# Patient Record
Sex: MALE | Race: WHITE | NOT HISPANIC OR LATINO | URBAN - METROPOLITAN AREA
[De-identification: names, ages, dates, MRNs, and addresses within clinical notes are randomized per-mention and may not be internally consistent; named-entity substitution may affect disease eponyms.]

---

## 2018-03-06 ENCOUNTER — EMERGENCY (EMERGENCY)
Facility: HOSPITAL | Age: 18
LOS: 0 days | Discharge: HOME | End: 2018-03-06
Attending: EMERGENCY MEDICINE | Admitting: INTERNAL MEDICINE

## 2018-03-06 VITALS
HEART RATE: 87 BPM | TEMPERATURE: 98 F | RESPIRATION RATE: 18 BRPM | SYSTOLIC BLOOD PRESSURE: 155 MMHG | DIASTOLIC BLOOD PRESSURE: 74 MMHG | OXYGEN SATURATION: 99 %

## 2018-03-06 DIAGNOSIS — R07.81 PLEURODYNIA: ICD-10-CM

## 2018-03-06 DIAGNOSIS — R06.02 SHORTNESS OF BREATH: ICD-10-CM

## 2018-03-06 DIAGNOSIS — Z98.890 OTHER SPECIFIED POSTPROCEDURAL STATES: ICD-10-CM

## 2018-03-06 LAB — D DIMER BLD IA.RAPID-MCNC: 123 NG/ML DDU — SIGNIFICANT CHANGE UP (ref 0–230)

## 2018-03-06 NOTE — ED PROVIDER NOTE - PHYSICAL EXAMINATION
Non-toxic in appearance. No respiratory distress. No pallor, no jaundice. Lungs CTA and equal b/l. S1 S2 regular, no murmur. Equal pulses in all extremities. ABD soft, no tenderness. No pedal edema, no calf tenderness. Right knee incision scar clean. No skin rash. No neurologic deficits.

## 2018-03-06 NOTE — ED PROVIDER NOTE - OBJECTIVE STATEMENT
18 y/o male S/P right ACL repair in 12/2017. c/o pleuritic chest pain and SOB on and off x 1 month. Non-exertional. No dizziness/diaphoresis/N/V/syncope. No recent travel. Non-smoker.  Sent by [facility] to R/O PE.  Had chest CT done pre-op which showed mildly dilated aortic root but no dissection.

## 2018-03-06 NOTE — ED PROVIDER NOTE - CARE PLAN
Assessment and plan of treatment:	Imp: Low suspicion for PE. Well's = 0.  A/P: Will check CXR and D-dimer and re-assess. Principal Discharge DX:	Chest pain  Assessment and plan of treatment:	Imp: Low suspicion for PE. Well's = 0.  A/P: Will check CXR and D-dimer and re-assess.

## 2019-04-26 ENCOUNTER — EMERGENCY (EMERGENCY)
Facility: HOSPITAL | Age: 19
LOS: 0 days | Discharge: HOME | End: 2019-04-26
Admitting: EMERGENCY MEDICINE
Payer: COMMERCIAL

## 2019-04-26 VITALS
HEART RATE: 89 BPM | RESPIRATION RATE: 18 BRPM | SYSTOLIC BLOOD PRESSURE: 156 MMHG | OXYGEN SATURATION: 99 % | TEMPERATURE: 98 F | DIASTOLIC BLOOD PRESSURE: 72 MMHG

## 2019-04-26 VITALS — WEIGHT: 259.93 LBS
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
## 2019-04-26 DIAGNOSIS — S89.90XA UNSPECIFIED INJURY OF UNSPECIFIED LOWER LEG, INITIAL ENCOUNTER: ICD-10-CM
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
## 2019-04-26 DIAGNOSIS — M25.561 PAIN IN RIGHT KNEE: ICD-10-CM

## 2019-04-26 DIAGNOSIS — Z79.1 LONG TERM (CURRENT) USE OF NON-STEROIDAL ANTI-INFLAMMATORIES (NSAID): ICD-10-CM

## 2019-04-26 PROCEDURE — 73562 X-RAY EXAM OF KNEE 3: CPT | Mod: 26,RT

## 2019-04-26 PROCEDURE — 73590 X-RAY EXAM OF LOWER LEG: CPT | Mod: 26,RT

## 2019-04-26 PROCEDURE — 99283 EMERGENCY DEPT VISIT LOW MDM: CPT

## 2019-04-26 NOTE — ED PROVIDER NOTE - PHYSICAL EXAMINATION
VITAL SIGNS: I have reviewed nursing notes and confirm.  CONSTITUTIONAL: Well-developed; well-nourished; in no acute distress.  SKIN: Skin exam is warm and dry, no acute rash.  HEAD: Normocephalic; atraumatic.  EYES: PERRL, EOM intact; conjunctiva and sclera clear.  ENT: No nasal discharge; airway clear.   NECK: Supple; non tender.  CARD: S1, S2 normal; no murmurs, gallops, or rubs. Regular rate and rhythm.  RESP: Clear to auscultation bilaterally. No wheezes, rales or rhonchi.  ABD: Normal bowel sounds; soft; non-distended; non-tender.   EXT: Decreased ROM of right knee however full passive ROM. +Swelling to right knee. Tenderness to medial aspect of right knee.   NEURO: Alert, oriented. Grossly unremarkable. No focal deficits.  PSYCH: Cooperative, appropriate.

## 2019-04-26 NOTE — ED PROVIDER NOTE - NSFOLLOWUPINSTRUCTIONS_ED_ALL_ED_FT
Knee Pain    WHAT YOU NEED TO KNOW:    What do I need to know about knee pain? Knee pain may start suddenly, or it may be a long-term problem. You may have pain on the side, front, or back of your knee. You may have knee stiffness and swelling. You may hear popping sounds or feel like your knee is giving way or locking up as you walk. You may feel pain when you sit, stand, walk, or climb up and down stairs.    What increases my risk for knee pain?     Obesity      A strain or tear in ligaments or tendons      A leg fracture or knee dislocation      Overuse of your knee      Osteoarthritis, rheumatoid arthritis, or gout      An infection, tumor, or cyst in your knee      Shoes that are not supportive, or training on a hard surface      Sports that involve jumping or pivoting on your knee    How is the cause of knee pain diagnosed? Your healthcare provider will examine your knee and ask about your symptoms. Tell your provider when the pain started and what you were doing at the time. Describe the pain, such as sharp, throbbing, or achy. Tell your provider about any knee injury or surgery you had. You may need any of the following:     MRI, CT, or ultrasound pictures may show an injury, fracture, or tumor.       Blood tests may be used to check the level of inflammation in your blood. The tests may also show signs of infection.      Arthroscopy is a procedure to look inside your knee joint with an arthroscope. An arthroscope is a flexible tube with a light and camera on the end. A knee arthroscopy is usually done to check for disease or damage inside your knee. These problems may be fixed during the procedure.    How is knee pain treated? Treatment will depend on the cause of your pain. You may need any of the following:     NSAIDs help decrease swelling and pain or fever. This medicine is available with or without a doctor's order. NSAIDs can cause stomach bleeding or kidney problems in certain people. If you take blood thinner medicine, always ask your healthcare provider if NSAIDs are safe for you. Always read the medicine label and follow directions.      Acetaminophen decreases pain and fever. It is available without a doctor's order. Ask how much to take and how often to take it. Follow directions. Read the labels of all other medicines you are using to see if they also contain acetaminophen, or ask your doctor or pharmacist. Acetaminophen can cause liver damage if not taken correctly. Do not use more than 4 grams (4,000 milligrams) total of acetaminophen in one day.       Prescription pain medicine may be given. Ask your healthcare provider how to take this medicine safely. Some prescription pain medicines contain acetaminophen. Do not take other medicines that contain acetaminophen without talking to your healthcare provider. Too much acetaminophen may cause liver damage. Prescription pain medicine may cause constipation. Ask your healthcare provider how to prevent or treat constipation.       Steroid injections may be given into your knee. Steroids reduce inflammation and pain.      Surgery may be used for some injuries, such as to repair a torn ACL.    What can I do to manage my symptoms?     Rest your knee so it can heal. Limit activities that increase your pain. Do low-impact exercises, such as walking or swimming.       Apply ice to help reduce swelling and pain. Use an ice pack, or put crushed ice in a plastic bag. Cover it with a towel before you apply it to your knee. Apply ice for 15 to 20 minutes every hour, or as directed.      Apply compression to help reduce swelling. Use a brace or bandage only as directed.      Elevate your knee to help decrease pain and swelling. Elevate your knee while you are sitting or lying down. Prop your leg on pillows to keep your knee above the level of your heart.      Prevent your knee from moving as directed. Your healthcare provider may put on a cast or splint. You may need to wear a leg brace to stabilize your knee. A leg brace can be adjusted to increase your range of motion as your knee heals. Hinged Knee Braces      What can I do to prevent knee pain?     Maintain a healthy weight. Extra weight increases your risk for knee pain. Ask your healthcare provider how much you should weigh. He or she can help you create a safe weight loss plan if you need to lose weight.    Exercise or train properly. Use the correct equipment for sports. Wear shoes that provide good support. Check your posture often as you exercise, play sports, or train for an event. This can help prevent stress and strain on your knees. Rest between sessions so you do not overwork your knees.    When should I seek immediate care?     Your pain is worse, even after treatment.     You cannot bend or straighten your leg completely.     The swelling around your knee does not go down even with treatment.    Your knee is painful and hot to the touch.     When should I contact my healthcare provider?     You have questions or concerns about your condition or care.

## 2019-04-26 NOTE — ED PROVIDER NOTE - NS ED ROS FT
Review of Systems:  	•	CONSTITUTIONAL - no fever, no diaphoresis, no chills  	•	SKIN - no rash  	•	HEMATOLOGIC - no bleeding, no bruising  	•	EYES - no eye pain, no blurry vision  	•	ENT - no congestion  	•	MUSCULOSKELETAL - +knee pain,  no swelling, no redness  	•	NEUROLOGIC - no weakness, no headache, no paresthesias  	•	PSYCH - no anxiety, non suicidal, non homicidal, no hallucination, no depression  	All other ROS are negative except as documented in HPI.

## 2019-04-26 NOTE — ED PROVIDER NOTE - CARE PROVIDER_API CALL
Eduardo Schaefer (MD)  Surgery  [address] [place] [address]  Phone: [phone]  Fax: [phone]  Follow Up Time: 1-3 Days

## 2019-04-26 NOTE — ED PROVIDER NOTE - CLINICAL SUMMARY MEDICAL DECISION MAKING FREE TEXT BOX
Knee pain after hearing pop yesterday while playing basketball with limited and painful ROM of right knee. Xray neg for fx or dislocation. Small knee effusion noted on xray. Likely knee sprain. Knee immobilizer applied. Given orthopedics fu. I have discussed the discharge plan with the patient. The patient agrees with the plan, as discussed.  The patient understands Emergency Department diagnosis is a preliminary diagnosis often based on limited information and that the patient must adhere to the follow-up plan as discussed.  The patient understands that if the symptoms worsen or if prescribed medications do not have the desired/planned effect that the patient may return to the Emergency Department at any time for further evaluation and treatment.

## 2019-04-26 NOTE — ED PROVIDER NOTE - OBJECTIVE STATEMENT
right knee pain started yesterday after he head a pop while jogging during play basketball hx of ACL tear. 20 yo M with pmhx of ACL tear with repair in 2017 presenting with right knee pain which started yesterday after he head a pop while jogging during play basketball. Symptoms are moderate. Worse with movement. No numbness, tingling, weakness, fever, or chills. Reports decreased ROM.

## 2022-06-22 NOTE — ED PEDIATRIC NURSE NOTE - CHIEF COMPLAINT
Documents faxed to number provided.     ----- Message from Jodi Otoniel sent at 6/22/2022  2:29 PM CDT -----  Amy Up with [facility] calling regarding the date change for this patient's colonoscopy.  She said that you change it to 6/27, she needs all of the paperwork on that.  The person in charge of that department probably originally received it but she is out until Monday.  Her fax number is [phone] and phone number [phone].  Thank you      
The patient is a 17y Male complaining of chest pain.
